# Patient Record
Sex: MALE | Race: AMERICAN INDIAN OR ALASKA NATIVE
[De-identification: names, ages, dates, MRNs, and addresses within clinical notes are randomized per-mention and may not be internally consistent; named-entity substitution may affect disease eponyms.]

---

## 2019-11-08 ENCOUNTER — HOSPITAL ENCOUNTER (OUTPATIENT)
Dept: HOSPITAL 5 - GIO | Age: 59
Discharge: HOME | End: 2019-11-08
Attending: INTERNAL MEDICINE
Payer: OTHER GOVERNMENT

## 2019-11-08 VITALS — SYSTOLIC BLOOD PRESSURE: 104 MMHG | DIASTOLIC BLOOD PRESSURE: 63 MMHG

## 2019-11-08 DIAGNOSIS — M19.90: ICD-10-CM

## 2019-11-08 DIAGNOSIS — Z79.899: ICD-10-CM

## 2019-11-08 DIAGNOSIS — Z98.890: ICD-10-CM

## 2019-11-08 DIAGNOSIS — Z80.0: ICD-10-CM

## 2019-11-08 DIAGNOSIS — K57.30: ICD-10-CM

## 2019-11-08 DIAGNOSIS — Z72.89: ICD-10-CM

## 2019-11-08 DIAGNOSIS — K64.8: ICD-10-CM

## 2019-11-08 DIAGNOSIS — Z80.8: ICD-10-CM

## 2019-11-08 DIAGNOSIS — Z12.11: Primary | ICD-10-CM

## 2019-11-08 PROCEDURE — 45378 DIAGNOSTIC COLONOSCOPY: CPT

## 2019-11-08 NOTE — ANESTHESIA CONSULTATION
Anesthesia Consult and Med Hx


Date of service: 11/08/19





- Airway


Anesthetic Teeth Evaluation: Edentulous


ROM Head & Neck: Adequate


Mental/Hyoid Distance: Adequate


Mallampati Class: Class II


Intubation Access Assessment: Probably Good





- Pre-Operative Health Status


ASA Pre-Surgery Classification: ASA2


Proposed Anesthetic Plan: MAC





- Pulmonary


Hx Smoking: No (past smoker)


Hx Asthma: No


Hx Respiratory Symptoms: No


SOB: No


COPD: No


Home Oxygen Therapy: No


Hx Pneumonia: No


Hx Sleep Apnea: No





- Cardiovascular System


Hx Hypertension: No


Hx Coronary Artery Disease: No


Hx Heart Attack/AMI: No


Hx Angina: No


Hx Percutaneous Transluminal Coronary Angioplasty (PTCA): No


Hx Cardia Arrhythmia: No


Hx Pacemaker: No


Hx Internal Defibrillator: No


Hx Valvular Heart Disease: No


Hx Heart Murmur: No


Hx Peripheral Vascular Disease: No





- Central Nervous System


Hx Neuromuscular Disorder: No


Hx Seizures: No


CVA: No


Hx Back Pain: Yes


Hx Psychiatric Problems: No





- Gastrointestinal


Hx Ulcer: No


Hx Gastroesophageal Reflux Disease: No





- Endocrine


Hx Renal Disease: No


Hx End Stage Renal Disease: No


Hx Cirrhosis: No


Hx Liver Disease: No


Hx Insulin Dependent Diabetes: No


Hx Non-Insulin Dependent Diabetes: No


Hx Thyroid Disease: No


Hx Hypothyroidism: No


Hx Hyperthyroidism: No





- Hematic


Hx Anemia: No


Hx Sickle Cell Disease: No





- Other Systems


Hx Alcohol Use: Yes (occ.)


Hx Substance Use: No


Hx Cancer: No


Hx Obesity: No

## 2019-11-08 NOTE — PROCEDURE NOTE
Date of procedure: 11/08/19


Pre-op diagnosis: Colon Polyp Screening/ F/H/O Cancer (sister)


Post-op diagnosis: other (No Colon Polyps noted/Minor,Proximal Colon 

Diverticuli/Moderate,Internal Hemorrhoid/Normal Ileal Mucosa)


Procedure: 





Colonoscopy


Anesthesia: MAC


Surgeon: JOSE BELL


Estimated blood loss: none


Pathology: none


Condition: stable


Disposition: same day

## 2019-11-08 NOTE — OPERATIVE REPORT
COLONOSCOPY



INDICATIONS:  A 59-year-old -American gentleman who has a family history

of cancer.  The patient's sister had some form of bone cancer.  Colonoscopy was

done as part of colon polyp screening.



DESCRIPTION OF PROCEDURE:  The procedure was done after getting informed consent

with MAC anesthesia.  Initial rectal exam was unremarkable.  Instrument was

passed through the rectum onto the cecum, which was identified with ileocecal

valve and appendiceal orifice.  The terminal ileum was intubated showed normal

mucosa.  The cecum was examined on the retroverted view.  No additional

pathology was noted.  There was a minor diverticula noted in the cecum.  The

remaining part of the proximal colon, the transverse colon, descending colon,

and sigmoid showed normal mucosa.  The rectum showed moderate internal

hemorrhoid on the retroverted view.  There was no bleeding associated with the

procedure.  No complications associated with the procedure.



ASSESSMENT:  Colon polyp screening, no colon polyps noted.  Minor proximal

colon, diverticula, moderate internal hemorrhoid.  The patient will be

encouraged to implement more fiber in his diet and also advised to use

over-the-counter hemorrhoidal medication for his moderate internal hemorrhoid

and asked to follow up in the office in 1-2 weeks' time.  The procedure was done

in the GI lab with assistance of the GI lab team, which included ISAMAR, Veronica Max including Marlene morrison and with assistance of anesthesia.





DD: 11/08/2019 10:39

DT: 11/08/2019 10:51

[facility]# [id number]  [id number]

COURTNEY/BULMARO